# Patient Record
Sex: MALE | Race: WHITE | Employment: FULL TIME | URBAN - METROPOLITAN AREA
[De-identification: names, ages, dates, MRNs, and addresses within clinical notes are randomized per-mention and may not be internally consistent; named-entity substitution may affect disease eponyms.]

---

## 2018-01-17 ENCOUNTER — OFFICE VISIT (OUTPATIENT)
Dept: CARDIOLOGY CLINIC | Age: 53
End: 2018-01-17

## 2018-01-17 VITALS
HEART RATE: 54 BPM | HEIGHT: 73 IN | DIASTOLIC BLOOD PRESSURE: 86 MMHG | RESPIRATION RATE: 18 BRPM | OXYGEN SATURATION: 93 % | SYSTOLIC BLOOD PRESSURE: 136 MMHG | BODY MASS INDEX: 38.17 KG/M2 | WEIGHT: 288 LBS

## 2018-01-17 DIAGNOSIS — R94.31 ABNORMAL FINDING ON EKG: Primary | ICD-10-CM

## 2018-01-17 DIAGNOSIS — F10.10 ALCOHOL ABUSE: ICD-10-CM

## 2018-01-17 DIAGNOSIS — I10 ESSENTIAL HYPERTENSION: ICD-10-CM

## 2018-01-17 DIAGNOSIS — M25.472 ANKLE EDEMA, BILATERAL: ICD-10-CM

## 2018-01-17 DIAGNOSIS — E66.9 OBESITY (BMI 30-39.9): ICD-10-CM

## 2018-01-17 DIAGNOSIS — M25.471 ANKLE EDEMA, BILATERAL: ICD-10-CM

## 2018-01-17 DIAGNOSIS — E11.65 TYPE 2 DIABETES MELLITUS WITH HYPERGLYCEMIA, WITHOUT LONG-TERM CURRENT USE OF INSULIN (HCC): ICD-10-CM

## 2018-01-17 DIAGNOSIS — E78.5 DYSLIPIDEMIA: ICD-10-CM

## 2018-01-17 RX ORDER — METFORMIN HYDROCHLORIDE 1000 MG/1
1000 TABLET ORAL 2 TIMES DAILY WITH MEALS
COMMUNITY
End: 2018-02-23 | Stop reason: ALTCHOICE

## 2018-01-17 RX ORDER — ATORVASTATIN CALCIUM 20 MG/1
TABLET, FILM COATED ORAL DAILY
COMMUNITY

## 2018-01-17 RX ORDER — LOSARTAN POTASSIUM AND HYDROCHLOROTHIAZIDE 25; 100 MG/1; MG/1
1 TABLET ORAL DAILY
COMMUNITY

## 2018-01-17 RX ORDER — PIOGLITAZONEHYDROCHLORIDE 45 MG/1
TABLET ORAL DAILY
COMMUNITY
End: 2018-02-23 | Stop reason: ALTCHOICE

## 2018-01-17 RX ORDER — ATENOLOL 50 MG/1
TABLET ORAL DAILY
COMMUNITY

## 2018-01-17 RX ORDER — NATEGLINIDE 60 MG/1
60 TABLET ORAL
COMMUNITY
End: 2018-05-21 | Stop reason: ALTCHOICE

## 2018-01-17 RX ORDER — FUROSEMIDE 20 MG/1
TABLET ORAL DAILY
COMMUNITY

## 2018-01-17 RX ORDER — ESCITALOPRAM OXALATE 20 MG/1
20 TABLET ORAL DAILY
COMMUNITY

## 2018-01-17 NOTE — PROGRESS NOTES
[id number] [facility]        [phone]                             NEW PATIENT HPI/FOLLOW-UP    NAME:  Benji Gutierrez   :   1965   MRN:   [id number]   PCP:  No primary care provider on file.           Subjective: The patient is a 47y.o. year old male non-smoker with PMHx of HTN,dyslipidemia, NIDDM, obesity, alcohol abuse(case/week) who is referred by PCP/NP foe several week/month hx of lower extremity swelling worse at time sitting or standing. Admits to standing on cement floors as  over > 20 yrs. Now drives truck all day delivering products. Denies change in exercise tolerance, chest pain, medication intolerance, palpitations, shortness of breath, PND/orthopnea wheezing, sputum, syncope, dizziness or light headedness. Denies prior cardiac issues. Review of Systems:     [] Unable to obtain  ROS due to  []mental status change  []sedated   []intubated   [x]Total of 12 systems reviewed as follows:  Constitutional: negative fever, negative chills, negative weight loss  Eyes:   negative visual changes  ENT:   negative sore throat, tongue or lip swelling  Chest/Resp:  negative cough, wheezing, negative dyspnea,tenderness  Cards:  negative for chest pain, decreased exercise endurance, palpitations, ++lower extremity edema,PND,orthopnea,syncpoe,dizziness,lightheadedness  GI:   negative for nausea, vomiting, diarrhea, and abdominal pain  :  negative for frequency, dysuria  Integument:  negative for rash and pruritus  Heme:  negative for easy bruising and gum/nose bleeding  Musculoskel: negative for myalgias,  back pain and muscle weakness  Neuro:  negative for headaches, dizziness, vertigo  Psych:  negative for feelings of anxiety, depression     No past medical history on file. There are no active problems to display for this patient. No past surgical history on file. Allergies not on file   No family history on file.    Social History     Social History    Marital status: N/A     Spouse name: N/A    Number of children: N/A    Years of education: N/A     Occupational History    Not on file. Social History Main Topics    Smoking status: Not on file    Smokeless tobacco: Not on file    Alcohol use Not on file    Drug use: Not on file    Sexual activity: Not on file     Other Topics Concern    Not on file     Social History Narrative      No current outpatient prescriptions on file. No current facility-administered medications for this visit. I have reviewed the nurses notes, vitals, problem list, allergy list, medical history, family medical, social history and medications. Objective:     Physical Exam:     Vitals:    01/17/18 1353   BP: 136/86   Pulse: (!) 54   Resp: 18   SpO2: 93%   Weight: 288 lb (130.6 kg)   Height: 6' 1\" (1.854 m)    Body mass index is 38 kg/(m^2). General: Well developed, obese in no acute distress. HEENT: No carotid bruits, no JVD, trach is midline. Heart:  Normal S1/S2 negative S3 or S4. Regular, no murmur, gallop or rub.   Respiratory: Clear bilaterally, no wheezing or rales  Abdomen:   Soft, non-tender, bowel sounds are active.   Extremities:  ++ lower extremity edema, normal cap refill, no cyanosis. Neuro: A&Ox3, speech clear, gait stable. Skin: Skin color is normal. No rashes or lesions. No diaphoresis. Vascular: 2+ pulses symmetric in all extremities        Data Review:       Cardiographics:    EKG: SB, Twave inversion anteroseptally    Cardiology Labs:    No results found for this or any previous visit. No results found for: CHOL, CHOLX, CHLST, CHOLV, 479187, HDL, LDL, LDLC, DLDLP, TGLX, TRIGL, TRIGP, CHHD, CHHDX    No results found for: NA, K, CL, CO2, AGAP, GLU, BUN, CREA, BUCR, GFRAA, GFRNA, CA, TBIL, TBILI, GPT, SGOT, AP, TP, ALB, GLOB, AGRAT, ALT       Assessment:       ICD-10-CM ICD-9-CM    1.  Abnormal finding on EKG R94.31 794.31 AMB POC EKG ROUTINE W/ 12 LEADS, INTER & REP metFORMIN (GLUCOPHAGE) 1,000 mg tablet      losartan-hydroCHLOROthiazide (HYZAAR) 100-25 mg per tablet      nateglinide (STARLIX) 60 mg tablet      pioglitazone (ACTOS) 45 mg tablet      atenolol (TENORMIN) 50 mg tablet      atorvastatin (LIPITOR) 20 mg tablet      escitalopram oxalate (LEXAPRO) 20 mg tablet      furosemide (LASIX) 20 mg tablet      2D ECHO COMPLETE ADULT (TTE) W OR WO CONTR      NUCLEAR STRESS TEST      NM CARDIAC SPECT W STRS/REST MULT   2. Ankle edema, bilateral M25.471 719.07 AMB POC EKG ROUTINE W/ 12 LEADS, INTER & REP    M25.472  metFORMIN (GLUCOPHAGE) 1,000 mg tablet      losartan-hydroCHLOROthiazide (HYZAAR) 100-25 mg per tablet      nateglinide (STARLIX) 60 mg tablet      pioglitazone (ACTOS) 45 mg tablet      atenolol (TENORMIN) 50 mg tablet      atorvastatin (LIPITOR) 20 mg tablet      escitalopram oxalate (LEXAPRO) 20 mg tablet      furosemide (LASIX) 20 mg tablet      2D ECHO COMPLETE ADULT (TTE) W OR WO CONTR      NUCLEAR STRESS TEST      NM CARDIAC SPECT W STRS/REST MULT   3. Essential hypertension I10 401.9 AMB POC EKG ROUTINE W/ 12 LEADS, INTER & REP      metFORMIN (GLUCOPHAGE) 1,000 mg tablet      losartan-hydroCHLOROthiazide (HYZAAR) 100-25 mg per tablet      nateglinide (STARLIX) 60 mg tablet      pioglitazone (ACTOS) 45 mg tablet      atenolol (TENORMIN) 50 mg tablet      atorvastatin (LIPITOR) 20 mg tablet      escitalopram oxalate (LEXAPRO) 20 mg tablet      furosemide (LASIX) 20 mg tablet      2D ECHO COMPLETE ADULT (TTE) W OR WO CONTR      NUCLEAR STRESS TEST      NM CARDIAC SPECT W STRS/REST MULT   4.  Dyslipidemia E78.5 272.4 AMB POC EKG ROUTINE W/ 12 LEADS, INTER & REP      metFORMIN (GLUCOPHAGE) 1,000 mg tablet      losartan-hydroCHLOROthiazide (HYZAAR) 100-25 mg per tablet      nateglinide (STARLIX) 60 mg tablet      pioglitazone (ACTOS) 45 mg tablet      atenolol (TENORMIN) 50 mg tablet      atorvastatin (LIPITOR) 20 mg tablet      escitalopram oxalate (LEXAPRO) 20 mg tablet      furosemide (LASIX) 20 mg tablet      2D ECHO COMPLETE ADULT (TTE) W OR WO CONTR      NUCLEAR STRESS TEST      NM CARDIAC SPECT W STRS/REST MULT   5. Type 2 diabetes mellitus with hyperglycemia, without long-term current use of insulin (HCC) E11.65 250.00 AMB POC EKG ROUTINE W/ 12 LEADS, INTER & REP     790.29 metFORMIN (GLUCOPHAGE) 1,000 mg tablet      losartan-hydroCHLOROthiazide (HYZAAR) 100-25 mg per tablet      nateglinide (STARLIX) 60 mg tablet      pioglitazone (ACTOS) 45 mg tablet      atenolol (TENORMIN) 50 mg tablet      atorvastatin (LIPITOR) 20 mg tablet      escitalopram oxalate (LEXAPRO) 20 mg tablet      furosemide (LASIX) 20 mg tablet      2D ECHO COMPLETE ADULT (TTE) W OR WO CONTR      NUCLEAR STRESS TEST      NM CARDIAC SPECT W STRS/REST MULT   6. Obesity (BMI 30-39. 9) E66.9 278.00 AMB POC EKG ROUTINE W/ 12 LEADS, INTER & REP      metFORMIN (GLUCOPHAGE) 1,000 mg tablet      losartan-hydroCHLOROthiazide (HYZAAR) 100-25 mg per tablet      nateglinide (STARLIX) 60 mg tablet      pioglitazone (ACTOS) 45 mg tablet      atenolol (TENORMIN) 50 mg tablet      atorvastatin (LIPITOR) 20 mg tablet      escitalopram oxalate (LEXAPRO) 20 mg tablet      furosemide (LASIX) 20 mg tablet      2D ECHO COMPLETE ADULT (TTE) W OR WO CONTR      NUCLEAR STRESS TEST      NM CARDIAC SPECT W STRS/REST MULT   7.  Alcohol abuse F10.10 305.00 AMB POC EKG ROUTINE W/ 12 LEADS, INTER & REP      metFORMIN (GLUCOPHAGE) 1,000 mg tablet      losartan-hydroCHLOROthiazide (HYZAAR) 100-25 mg per tablet      nateglinide (STARLIX) 60 mg tablet      pioglitazone (ACTOS) 45 mg tablet      atenolol (TENORMIN) 50 mg tablet      atorvastatin (LIPITOR) 20 mg tablet      escitalopram oxalate (LEXAPRO) 20 mg tablet      furosemide (LASIX) 20 mg tablet      2D ECHO COMPLETE ADULT (TTE) W OR WO CONTR      NUCLEAR STRESS TEST      NM CARDIAC SPECT W STRS/REST MULT         Discussion: Patient presents at this time with new onset lower extremity edema associated with abnormal EKG suggesting anteroseptal ischemia, NIDDM,dyslipidemia,HTN, obesity, alcohol abuse. Will obtain NST to exclude underlying CAD and echo to exclude structural heart disease. I suspect lower extremity edema mostly due to CVI. After results reviewed consider readjusting antihypertensive meds to exclude amlodipine,BB. Suspect he would tolerate Cardizem or Verapamil,chlorthalidone, ARB and intermittent Lasix and compression stockings. Plan: 1. Continue same meds. Lipid profile and labs followed by PCP. 2.Encouraged to exercise to tolerance, lose weight and follow low fat, low cholesterol, low sodium predominantly Plant-based (consider Mediterranean) diet. Call with questions or concerns. Will follow up any test results by phone and/or f/u here in office if needed. Kirsten Canada 3.Follow up: 3-4 weeks    I have discussed the diagnosis with the patient and the intended plan as seen in the above orders. The patient has received an after-visit summary and questions were answered concerning future plans. I have discussed any concerning medication side effects and warnings with the patient as well.     Jenny Oliveira MD  1/17/2018

## 2018-01-17 NOTE — PROGRESS NOTES
1. Have you been to the ER, urgent care clinic since your last visit? Hospitalized since your last visit? No    2. Have you seen or consulted any other health care providers outside of the [address] [place] [address] since your last visit? Include any pap smears or colon screening. No    Chief Complaint   Patient presents with    Ankle swelling     NP, ref by Dr. Jj Alanis, ankle edema. Denied cardiac symptoms.

## 2018-02-23 ENCOUNTER — OFFICE VISIT (OUTPATIENT)
Dept: ENDOCRINOLOGY | Age: 53
End: 2018-02-23

## 2018-02-23 VITALS
HEIGHT: 73 IN | WEIGHT: 297 LBS | DIASTOLIC BLOOD PRESSURE: 81 MMHG | HEART RATE: 54 BPM | SYSTOLIC BLOOD PRESSURE: 129 MMHG | BODY MASS INDEX: 39.36 KG/M2

## 2018-02-23 DIAGNOSIS — E11.9 TYPE 2 DIABETES MELLITUS WITHOUT COMPLICATION, WITHOUT LONG-TERM CURRENT USE OF INSULIN (HCC): Primary | ICD-10-CM

## 2018-02-23 DIAGNOSIS — I10 ESSENTIAL HYPERTENSION WITH GOAL BLOOD PRESSURE LESS THAN 130/80: ICD-10-CM

## 2018-02-23 DIAGNOSIS — E78.5 HYPERLIPIDEMIA, UNSPECIFIED HYPERLIPIDEMIA TYPE: ICD-10-CM

## 2018-02-23 LAB — HBA1C MFR BLD HPLC: 5.8 %

## 2018-02-23 RX ORDER — AMLODIPINE BESYLATE 5 MG/1
5 TABLET ORAL DAILY
COMMUNITY

## 2018-02-23 NOTE — PROGRESS NOTES
CONSULTATION REQUESTED BY: Terance Leventhal, NP     REASON FOR CONSULT:  Evaluation for type 2 diabetes    CHIEF COMPLAINT: Blood glucose is high    HISTORY OF PRESENT ILLNESS:   Radha Jimenez is a 47 y.o. male with a PMHx as noted below who was referred to our endocrinology clinic for evaluation of type 2 diabetes. Diabetes History:  Diabetes was diagnosed around 2016  Family History of diabetes is positive in both grandmother  Last A1c prior to initial visit was 5.8% in Dec, again today per POC    Current Home Regimen:  - Nateglinide 60mg TID with meals  - Pioglitazone 45mg daily   - Metformin 1000mg BID    Review of home glucose:  Not checking his blood sugars    Review of most recent diabetes-related labs:  Labs scanned into chart, just seem to not have been entered as numerical values. PAST MEDICAL/SURGICAL HISTORY:   Type 2 diabetes, HTN, HLD    ALLERGIES:   No Known Allergies    MEDICATIONS ON ADMISSION:     Current Outpatient Prescriptions:     amLODIPine (NORVASC) 5 mg tablet, Take 5 mg by mouth daily. , Disp: , Rfl:     metFORMIN (GLUCOPHAGE) 1,000 mg tablet, Take 1,000 mg by mouth two (2) times daily (with meals). , Disp: , Rfl:     losartan-hydroCHLOROthiazide (HYZAAR) 100-25 mg per tablet, Take 1 Tab by mouth daily. , Disp: , Rfl:     nateglinide (STARLIX) 60 mg tablet, Take 60 mg by mouth Before breakfast, lunch, and dinner., Disp: , Rfl:     pioglitazone (ACTOS) 45 mg tablet, Take  by mouth daily. , Disp: , Rfl:     atenolol (TENORMIN) 50 mg tablet, Take  by mouth daily. , Disp: , Rfl:     atorvastatin (LIPITOR) 20 mg tablet, Take  by mouth daily. , Disp: , Rfl:     escitalopram oxalate (LEXAPRO) 20 mg tablet, Take 20 mg by mouth daily. , Disp: , Rfl:     furosemide (LASIX) 20 mg tablet, Take  by mouth daily. , Disp: , Rfl:     SOCIAL HISTORY:   Social History     Social History    Marital status:      Spouse name: N/A    Number of children: N/A    Years of education: N/A Occupational History    Not on file. Social History Main Topics    Smoking status: Former Smoker     Packs/day: 1.00     Years: 1.00     Quit date: 1/17/1988    Smokeless tobacco: Current User    Alcohol use Yes      Comment: 24 beers a week    Drug use: Not on file    Sexual activity: Not on file     Other Topics Concern    Not on file     Social History Narrative       FAMILY HISTORY:  Family History   Problem Relation Age of Onset    Elevated Lipids Mother     Arthritis-osteo Father     Diabetes Maternal Grandmother     Elevated Lipids Maternal Grandmother     Hypertension Maternal Grandmother     Cancer Maternal Grandfather     Diabetes Paternal Grandmother     Hypertension Paternal Grandmother        REVIEW OF SYSTEMS: Complete ROS assessed and noted for that which is described above, all else are negative.   Eyes: normal  ENT: normal  CVS: normal  Resp: normal  GI: normal  : normal  GYN: normal  Endocrine: normal  Integument: normal  Musculoskeletal: normal  Neuro: normal  Psych: normal      PHYSICAL EXAMINATION:    VITAL SIGNS:  Visit Vitals    /81 (BP 1 Location: Left arm, BP Patient Position: Sitting)    Pulse (!) 54    Ht 6' 1\" (1.854 m)    Wt 297 lb (134.7 kg)    BMI 39.18 kg/m2       GENERAL: NCAT, Sitting comfortably, NAD  EYES: EOMI, non-icteric, no proptosis  Ear/Nose/Throat: NCAT, no inflammation, no masses  LYMPH NODES: No LAD  CARDIOVASCULAR: S1 S2, RRR, No murmur, 2+ radial pulses  RESPIRATORY: CTA b/l, no wheeze/rales  GASTROINTESTINAL: NT, ND  MUSCULOSKELETAL: Normal ROM, no atrophy  SKIN: warm, no edema/rash/ or other skin changes  NEUROLOGIC: 5/5 power all extremities, no tremor, AAOx3  PSYCHIATRIC: Normal affect, Normal insight and judgement         Diabetic foot exam performed by Clinton Wood MD     Measurement  Response Nurse Comment Physician Comment   Monofilament  R - normal sensation with micro filament  L - normal sensation with micro filament Pulse DP R - 2+ (normal)  L - 2+ (normal)     Vibration R - Normal    L - Normal     Structural deformity R - Mild - early hammertoes  L - Mild - early hammertoes, thick pads     Skin Integrity / Deformity R - Mild - thick skin  L - Mild - thick skin        Reviewed by:   * I am treating the patient under a comprehensive plan of care for diabetes and the patient would benefit from diabetic footwear to protect their feet, and this includes shoes and insoles. REVIEW OF LABORATORY AND RADIOLOGY DATA:   Labs and documentation have been reviewed as described above. ASSESSMENT AND PLAN:   Flex Walter is a 47 y.o. male with a PMHx as noted above who was referred to our endocrinology clinic for evaluation of type 2 diabetes. Problems:  Type 2 diabetes  Hyperlipidemia  Hypertension    We had the pleasure of reviewing together the basics of diabetes including basic pathophysiology and diabetes care. We further discussed the importance of checking home glucose regularly and taking all of their scheduled medications in order to have the best possible outcome. I was able to answer any questions they had in clinic today and they are invited to reach me if they have any further questions. Based upon our discussion together today we have decided to make the following changes:    Patients diabetes is controlled, however he is having side effects from his treatment. For example he is having progressive leg swelling likely from the pioglitazone for which he takes an additional medication (lasix). It would be ideal to stop this medication now that he has insurance with something safer and effective. Also we can combine a new med with metformin such as janumet for example. Denies hx of thyroid issues or pancreatitis.  He is on nateglinide which is ok for now, as I would not want to make to many medication changes at once, but if stable on next visit, we can trial off of it, or replace it with an SGLT-2 inhibitor which we discussed today. We discussed the trends in A1c with weight, and the need to walk and watch diet. PLAN  Type 2 Diabetes  Medications:  Stop metformin (included in Janumet)  Stop pioglitazone (side effects / edema)  Start Janumet  BID  Continue nateglinide TID with meals  Advised to check glucose qAM  Provided with glucose log sheets for later review. Labs: Up to date  Feet: Examination performed today. Encouraged regular \"self-checks\" at home. Eyes: Advised updated eye exam report faxed to our office for review. Kidney: GFR/Urine microalbumin reviewed and stable    HTN: BP stable on amlodipine/losartan-HCTZ/atenolol  HLD: continue lipitor    RTC: I would like to see them back in 3 months.   >60 minutes spent together with patient today of which >50% of this time was spent in counseling and coordination of care. Karlene Gama.  [address] [facility]

## 2018-02-23 NOTE — PATIENT INSTRUCTIONS
STOP Metformin   STOP Pioglitazone    START Janumet before breakfast and before dinner  Continue Nateglinide before each meal ( we will consider stopping this later )    Continue all your blood pressure medications in the AM  Can take your lipitor    ----------------------------------------------------------------------------------------------------------------------    Below you will find a glucose log sheet which you can use to record your blood sugars. Without checking and recording what your home glucose levels are, it will be difficult to make any changes to your medication dose, even when significant changes may be needed. Please feel free to use the log below to record your home glucose levels. At the very least, I would like for you to login the entire 2-3 weeks just before your visit so we can make your visit much more productive and beneficial to you. GLUCOSE LOG SHEET:    Date Breakfast Lunch Dinner Bedtime Comments ? GLUCOSE LOG SHEET:    Date Breakfast Lunch Dinner Bedtime Comments ? GLUCOSE LOG SHEET:    Date Breakfast Lunch Dinner Bedtime Comments ?                                                                                                                                                                                                                                              Dr. Cisco Graham    Back to basics:    When you have diabetes or pre-diabetes, as you know, your body has difficulty dealing with the sugar it absorbs from your meals. An unrelated but very relevant term you may have heard before, quantitative easing, has a new meaning: By gradually reducing the load of sugars entering the body, you ease the stress on the body and allow it to catch up with the work it must do. This in turn will allow your body to work better. On top of this, remember one point, the more sugar stress your body has, the more you enter a state of glucose toxicity and this is a state where you become even more resistant. Thats right higher sugar = more resistance, not only to your own bodies attempt to fix the problem but also resistance to your medications. This is why medications work best when a proper diet is followed. Tip 1. Dont forget the protein. When you add a portion of meat or other low carb protein food in your meal, it provides healthy calories which contribute to reducing that feeling of hunger that drives you to eat what you dont want. Tip 2. Portions are a real thing. Before you eat, stop and look at your plate/table. Count how many items have sugars/carbs in them. A sandwich (bread) ? Sissy Lino ? Sweet drink ? Potatoe ? Pasta ? Rice ? You would be surprised when you become aware. Aim for a reasonable portion of carbs, and if you feel you absolutely cannot do this, at least start working toward this. 45-60 grams is usually more than enough in one meal. And YES, than includes the desert! Tip 3. Three meals per day, snack-free in between! Your body needs a break. Eating an adequate meal keeps you from getting hungry and reaching for a snack in between meals. Recall that most of the time, diabetic patients are not treating these snacks. Dont eat dinner late at night, but rather allow more overnight fasting time for your body to recover.  If you eat dinner at 8 PM, try 6 PM.  Sporadic eating is the opposite of what you need, and adjusting to a regular eating schedule such as this will not only be a great benefit to your body, but your medications will also tend to work better at keeping your diabetes under control. Tip 4. There is no best diet when it comes to weight loss. When comparing diets and outcomes, the main ingredient when searching for weight loss was calories ! Lower calories = better weight loss. Pick a healthy diet thats right for you, i.e. diabetes friendly, and evaluate your daily calorie intake. And of course this would be of no use without exercise. Calories in need calories out.

## 2018-02-23 NOTE — MR AVS SNAPSHOT
[address] [facility] [address] [address] [phone] Patient: Ghazala Be MRN: [id number] 
:1965 Visit Information Date & Time Provider Department Dept. Phone Encounter #  
 2018  1:30 PM Maliha Villafana, [address] [place] [address] Diabetes and Endocrinology [phone] [id number] Follow-up Instructions Return in about 3 months (around 2018). Upcoming Health Maintenance Date Due  
 FOOT EXAM Q1 1976 EYE EXAM RETINAL OR DILATED Q1 1976 Pneumococcal 19-64 Medium Risk (1 of 1 - PPSV23) 1985 DTaP/Tdap/Td series (1 - Tdap) 1987 FOBT Q 1 YEAR AGE 50-75 2016 Influenza Age 5 to Adult 2017 HEMOGLOBIN A1C Q6M 2018 MICROALBUMIN Q1 2018 LIPID PANEL Q1 2018 Allergies as of 2018  Review Complete On: 2018 By: Maliha Villafana MD  
 No Known Allergies Current Immunizations  Never Reviewed No immunizations on file. Not reviewed this visit You Were Diagnosed With   
  
 Codes Comments Type 2 diabetes mellitus without complication, without long-term current use of insulin (HCC)    -  Primary ICD-10-CM: E11.9 ICD-9-CM: 250.00 Essential hypertension with goal blood pressure less than 130/80     ICD-10-CM: I10 
ICD-9-CM: 401.9 Hyperlipidemia, unspecified hyperlipidemia type     ICD-10-CM: E78.5 ICD-9-CM: 272.4 Vitals BP Pulse Height(growth percentile) Weight(growth percentile) BMI Smoking Status 129/81 (BP 1 Location: Left arm, BP Patient Position: Sitting) (!) 54 6' 1\" (1.854 m) 297 lb (134.7 kg) 39.18 kg/m2 Former Smoker BMI and BSA Data Body Mass Index Body Surface Area  
 39.18 kg/m 2 2.63 m 2 Preferred Pharmacy Pharmacy Name Phone [id number] [place] [address] [place] [address] Way [phone] Your Updated Medication List  
  
   
 This list is accurate as of 2/23/18  2:23 PM.  Always use your most recent med list. amLODIPine 5 mg tablet Commonly known as:  Ardeen Squire Take 5 mg by mouth daily. atenolol 50 mg tablet Commonly known as:  TENORMIN Take  by mouth daily. atorvastatin 20 mg tablet Commonly known as:  LIPITOR Take  by mouth daily. escitalopram oxalate 20 mg tablet Commonly known as:  Clevester Clap Take 20 mg by mouth daily. LASIX 20 mg tablet Generic drug:  furosemide Take  by mouth daily. losartan-hydroCHLOROthiazide 100-25 mg per tablet Commonly known as:  HYZAAR Take 1 Tab by mouth daily. nateglinide 60 mg tablet Commonly known as:  Lendell [place] Take 60 mg by mouth Before breakfast, lunch, and dinner. SITagliptin-metFORMIN 50-1,000 mg per tablet Commonly known as:  Sable Pink Take 1 Tab by mouth two (2) times daily (with meals). Follow-up Instructions Return in about 3 months (around 5/23/2018). Patient Instructions STOP Metformin STOP Pioglitazone START Janumet before breakfast and before dinner Continue Nateglinide before each meal ( we will consider stopping this later ) Continue all your blood pressure medications in the AM 
Can take your lipitor 
 
---------------------------------------------------------------------------------------------------------------------- Below you will find a glucose log sheet which you can use to record your blood sugars. Without checking and recording what your home glucose levels are, it will be difficult to make any changes to your medication dose, even when significant changes may be needed. Please feel free to use the log below to record your home glucose levels. At the very least, I would like for you to login the entire 2-3 weeks just before your visit so we can make your visit much more productive and beneficial to you.   
 
GLUCOSE LOG SHEET: 
 
 Date Breakfast Lunch Dinner Bedtime Comments ? GLUCOSE LOG SHEET: 
 
Date Breakfast Lunch Dinner Bedtime Comments ? GLUCOSE LOG SHEET: 
 
Date Breakfast Lunch Dinner Bedtime Comments ? Dr. Rona Covarrubias Back to basics: 
 
When you have diabetes or pre-diabetes, as you know, your body has difficulty dealing with the sugar it absorbs from your meals. An unrelated but very relevant term you may have heard before, quantitative easing, has a new meaning: By gradually reducing the load of sugars entering the body, you ease the stress on the body and allow it to catch up with the work it must do. This in turn will allow your body to work better. On top of this, remember one point, the more sugar stress your body has, the more you enter a state of glucose toxicity and this is a state where you become even more resistant. Thats right higher sugar = more resistance, not only to your own bodies attempt to fix the problem but also resistance to your medications. This is why medications work best when a proper diet is followed. Tip 1. Dont forget the protein.  When you add a portion of meat or other low carb protein food in your meal, it provides healthy calories which contribute to reducing that feeling of hunger that drives you to eat what you dont want. Tip 2. Portions are a real thing. Before you eat, stop and look at your plate/table. Count how many items have sugars/carbs in them. A sandwich (bread) ? Lacy Tadeo ? Sweet drink ? Potatoe ? Pasta ? Rice ? You would be surprised when you become aware. Aim for a reasonable portion of carbs, and if you feel you absolutely cannot do this, at least start working toward this. 45-60 grams is usually more than enough in one meal. And YES, than includes the desert! Tip 3. Three meals per day, snack-free in between! Your body needs a break. Eating an adequate meal keeps you from getting hungry and reaching for a snack in between meals. Recall that most of the time, diabetic patients are not treating these snacks. Dont eat dinner late at night, but rather allow more overnight fasting time for your body to recover. If you eat dinner at 8 PM, try 6 PM.  Sporadic eating is the opposite of what you need, and adjusting to a regular eating schedule such as this will not only be a great benefit to your body, but your medications will also tend to work better at keeping your diabetes under control. Tip 4. There is no best diet when it comes to weight loss. When comparing diets and outcomes, the main ingredient when searching for weight loss was calories ! Lower calories = better weight loss. Pick a healthy diet thats right for you, i.e. diabetes friendly, and evaluate your daily calorie intake. And of course this would be of no use without exercise. Calories in need calories out. Introducing hospitals & HEALTH SERVICES! [facility] introduces [employer] patient portal. Now you can access parts of your medical record, email your doctor's office, and request medication refills online. 1. In your internet browser, go to [email or web address] 2. Click on the First Time User? Click Here link in the Sign In box.  You will see the New Member Sign Up page. 3. Enter your Ultriva Access Code exactly as it appears below. You will not need to use this code after youve completed the sign-up process. If you do not sign up before the expiration date, you must request a new code. · Ult[employer]a Access Code: [email or web address] Expires: 4/17/2018  1:37 PM 
 
4. Enter the last four digits of your Social Security Number (xxxx) and Date of Birth (mm/dd/yyyy) as indicated and click Submit. You will be taken to the next sign-up page. 5. Create a Ultriva ID. This will be your Ultriva login ID and cannot be changed, so think of one that is secure and easy to remember. 6. Create a Ultriva password. You can change your password at any time. 7. Enter your Password Reset Question and Answer. This can be used at a later time if you forget your password. 8. Enter your e-mail address. You will receive e-mail notification when new information is available in [address] 9. Click Sign Up. You can now view and download portions of your medical record. 10. Click the Download Summary menu link to download a portable copy of your medical information. If you have questions, please visit the Frequently Asked Questions section of the Ult[employer] website. Remember, Ultriva is NOT to be used for urgent needs. For medical emergencies, dial 911. Now available from your iPhone and Android! Please provide this summary of care documentation to your next provider. Your primary care clinician is listed as Radha Wright. If you have any questions after today's visit, please call [phone].

## 2018-05-21 ENCOUNTER — OFFICE VISIT (OUTPATIENT)
Dept: ENDOCRINOLOGY | Age: 53
End: 2018-05-21

## 2018-05-21 VITALS
HEART RATE: 60 BPM | WEIGHT: 296 LBS | DIASTOLIC BLOOD PRESSURE: 80 MMHG | HEIGHT: 73 IN | BODY MASS INDEX: 39.23 KG/M2 | SYSTOLIC BLOOD PRESSURE: 132 MMHG

## 2018-05-21 DIAGNOSIS — I10 ESSENTIAL HYPERTENSION WITH GOAL BLOOD PRESSURE LESS THAN 130/80: ICD-10-CM

## 2018-05-21 DIAGNOSIS — E11.9 TYPE 2 DIABETES MELLITUS WITHOUT COMPLICATION, WITHOUT LONG-TERM CURRENT USE OF INSULIN (HCC): Primary | ICD-10-CM

## 2018-05-21 DIAGNOSIS — E78.5 HYPERLIPIDEMIA, UNSPECIFIED HYPERLIPIDEMIA TYPE: ICD-10-CM
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
## 2018-05-21 NOTE — PATIENT INSTRUCTIONS
STOP STARLIX    Continue Janumet twice daily    Start Anju Lees (use coupon for $0 copay), 10mg daily  If insurance prefers a different brand of the same medicine, I will order that one instead,       See you back in 4 months,     Tanmay OCONNELL.  [address] [facility] Endocrinology  [address] [place] [address]

## 2018-05-21 NOTE — MR AVS SNAPSHOT
[address] [facility] [address] [address] [phone] Patient: Shabbir Durham MRN: [id number] 
:1965 Visit Information Date & Time Provider Department Dept. Phone Encounter #  
 2018  3:30 PM Aicha Kahn, [address] [place] [address] Diabetes and Endocrinology [phone] [id number] Follow-up Instructions Return in about 4 months (around 2018). Upcoming Health Maintenance Date Due  
 EYE EXAM RETINAL OR DILATED Q1 1976 Pneumococcal 19-64 Medium Risk (1 of 1 - PPSV23) 1985 DTaP/Tdap/Td series (1 - Tdap) 1987 FOBT Q 1 YEAR AGE 50-75 2016 Influenza Age 5 to Adult 2018 HEMOGLOBIN A1C Q6M 2018 MICROALBUMIN Q1 2018 LIPID PANEL Q1 2018 FOOT EXAM Q1 2019 Allergies as of 2018  Review Complete On: 2018 By: Aicha Kahn MD  
 No Known Allergies Current Immunizations  Never Reviewed No immunizations on file. Not reviewed this visit You Were Diagnosed With   
  
 Codes Comments Type 2 diabetes mellitus without complication, without long-term current use of insulin (HCC)    -  Primary ICD-10-CM: E11.9 ICD-9-CM: 250.00 Hyperlipidemia, unspecified hyperlipidemia type     ICD-10-CM: E78.5 ICD-9-CM: 272.4 Essential hypertension with goal blood pressure less than 130/80     ICD-10-CM: I10 
ICD-9-CM: 401.9 Vitals BP Pulse Height(growth percentile) Weight(growth percentile) BMI Smoking Status 132/80 (BP 1 Location: Right arm, BP Patient Position: Sitting) 60 6' 1\" (1.854 m) 296 lb (134.3 kg) 39.05 kg/m2 Former Smoker Vitals History BMI and BSA Data Body Mass Index Body Surface Area 39.05 kg/m 2 2.63 m 2 Preferred Pharmacy Pharmacy Name Phone [id number] [address] [phone] Your Updated Medication List  
  
   
 This list is accurate as of 5/21/18  3:48 PM.  Always use your most recent med list. amLODIPine 5 mg tablet Commonly known as:  Aziza Zarco Take 5 mg by mouth daily. atenolol 50 mg tablet Commonly known as:  TENORMIN Take  by mouth daily. atorvastatin 20 mg tablet Commonly known as:  LIPITOR Take  by mouth daily. dapagliflozin 10 mg Tab tablet Commonly known as:  [employer] Take 1 Tab by mouth daily. escitalopram oxalate 20 mg tablet Commonly known as:  Víctor Orts Take 20 mg by mouth daily. LASIX 20 mg tablet Generic drug:  furosemide Take  by mouth daily. losartan-hydroCHLOROthiazide 100-25 mg per tablet Commonly known as:  HYZAAR Take 1 Tab by mouth daily. SITagliptin-metFORMIN 50-1,000 mg per tablet Commonly known as:  Christain Diesel Take 1 Tab by mouth two (2) times daily (with meals). Prescriptions Sent to Pharmacy Refills  
 dapagliflozin (FARXIGA) 10 mg tab tablet 3 Sig: Take 1 Tab by mouth daily. Class: Normal  
 Pharmacy: [id number] - [place] Dee Tirado [address] [place] [address] #: [phone] Route: Oral  
  
Follow-up Instructions Return in about 4 months (around 9/21/2018). Patient Instructions STOP STARLIX Continue Janumet twice daily Start Vearl Tawnya (use coupon for $0 copay), 10mg daily If insurance prefers a different brand of the same medicine, I will order that one instead, See you back in 4 months,  
 
Hortencia PRESCOTT [address] [facility] End[facility] [employer] [facility] SERVICES! Winnie Ann introduces "[employer] patient portal. Now you can access parts of your medical record, email your doctor's office, and request medication refills online. 1. In your internet browser, go to [email or web address] 2. Click on the First Time User? Click Here link in the Sign In box.  You will see the New Member Sign Up page. 3. Enter your Apothesource Access Code exactly as it appears below. You will not need to use this code after youve completed the sign-up process. If you do not sign up before the expiration date, you must request a new code. · [employer] Access Code: [email or web address] Expires: 8/19/2018  3:48 PM 
 
4. Enter the last four digits of your Social Security Number (xxxx) and Date of Birth (mm/dd/yyyy) as indicated and click Submit. You will be taken to the next sign-up page. 5. Create a Apothesource ID. This will be your [employer] login ID and cannot be changed, so think of one that is secure and easy to remember. 6. Create a [employer] password. You can change your password at any time. 7. Enter your Password Reset Question and Answer. This can be used at a later time if you forget your password. 8. Enter your e-mail address. You will receive e-mail notification when new information is available in [address] 9. Click Sign Up. You can now view and download portions of your medical record. 10. Click the Download Summary menu link to download a portable copy of your medical information. If you have questions, please visit the Frequently Asked Questions section of the [employer] website. Remember, [employer] is NOT to be used for urgent needs. For medical emergencies, dial 911. Now available from your iPhone and Android! Please provide this summary of care documentation to your next provider. Your primary care clinician is listed as Miguel Greco. If you have any questions after today's visit, please call [phone].

## 2018-05-29 NOTE — PROGRESS NOTES
No coverage for farxiga,   Will try to request Jamila Cruz patients wife of coupon card online,    Dayday Melendez.  [address]

## 2018-09-20 ENCOUNTER — OFFICE VISIT (OUTPATIENT)
Dept: ENDOCRINOLOGY | Age: 53
End: 2018-09-20

## 2018-09-20 VITALS
WEIGHT: 273 LBS | HEIGHT: 73 IN | BODY MASS INDEX: 36.18 KG/M2 | SYSTOLIC BLOOD PRESSURE: 111 MMHG | DIASTOLIC BLOOD PRESSURE: 68 MMHG | HEART RATE: 54 BPM

## 2018-09-20 DIAGNOSIS — E11.9 TYPE 2 DIABETES MELLITUS WITHOUT COMPLICATION, WITHOUT LONG-TERM CURRENT USE OF INSULIN (HCC): Primary | ICD-10-CM

## 2018-09-20 DIAGNOSIS — E78.5 HYPERLIPIDEMIA, UNSPECIFIED HYPERLIPIDEMIA TYPE: ICD-10-CM

## 2018-09-20 DIAGNOSIS — I10 ESSENTIAL HYPERTENSION WITH GOAL BLOOD PRESSURE LESS THAN 130/80: ICD-10-CM

## 2018-09-20 NOTE — MR AVS SNAPSHOT
[address] [facility] [address] [address] [phone] Patient: Sophie Leonardo MRN: [id number] 
:1965 Visit Information Date & Time Provider Department Dept. Phone Encounter #  
 2018  3:30 PM Radha Haywood, [address] [place] [address] Diabetes and Endocrinology [phone] [id number] Follow-up Instructions Return in about 4 months (around 2019). Upcoming Health Maintenance Date Due  
 EYE EXAM RETINAL OR DILATED Q1 1976 Pneumococcal 19-64 Medium Risk (1 of 1 - PPSV23) 1985 DTaP/Tdap/Td series (1 - Tdap) 1987 FOBT Q 1 YEAR AGE 50-75 2016 Influenza Age 5 to Adult 2018 HEMOGLOBIN A1C Q6M 2018 MICROALBUMIN Q1 2018 LIPID PANEL Q1 2018 FOOT EXAM Q1 2019 Allergies as of 2018  Review Complete On: 2018 By: Radha Haywood MD  
 No Known Allergies Current Immunizations  Never Reviewed No immunizations on file. Not reviewed this visit You Were Diagnosed With   
  
 Codes Comments Type 2 diabetes mellitus without complication, without long-term current use of insulin (HCC)    -  Primary ICD-10-CM: E11.9 ICD-9-CM: 250.00 Hyperlipidemia, unspecified hyperlipidemia type     ICD-10-CM: E78.5 ICD-9-CM: 272.4 Essential hypertension with goal blood pressure less than 130/80     ICD-10-CM: I10 
ICD-9-CM: 401.9 Vitals BP Pulse Height(growth percentile) Weight(growth percentile) BMI Smoking Status 111/68 (BP 1 Location: Left arm, BP Patient Position: Sitting) (!) 54 6' 1\" (1.854 m) 273 lb (123.8 kg) 36.02 kg/m2 Former Smoker Vitals History BMI and BSA Data Body Mass Index Body Surface Area 36.02 kg/m 2 2.53 m 2 Preferred Pharmacy Pharmacy Name Phone [id number] [address] [facility] [phone] Your Updated Medication List  
  
   
 This list is accurate as of 9/20/18  3:46 PM.  Always use your most recent med list. amLODIPine 5 mg tablet Commonly known as:  Rudy Lux Take 5 mg by mouth daily. atenolol 50 mg tablet Commonly known as:  TENORMIN Take  by mouth daily. atorvastatin 20 mg tablet Commonly known as:  LIPITOR Take  by mouth daily. empagliflozin 25 mg tablet Commonly known as:  Katelin Spire Take 1 Tab by mouth daily. escitalopram oxalate 20 mg tablet Commonly known as:  Judy Basket Take 20 mg by mouth daily. LASIX 20 mg tablet Generic drug:  furosemide Take  by mouth daily. losartan-hydroCHLOROthiazide 100-25 mg per tablet Commonly known as:  HYZAAR Take 1 Tab by mouth daily. SITagliptin-metFORMIN 50-1,000 mg per tablet Commonly known as:  Welford Dominion Take 1 Tab by mouth two (2) times daily (with meals). We Performed the Following AMB POC HEMOGLOBIN A1C [32961 CPT(R)] HEMOGLOBIN A1C WITH EAG [62989 CPT(R)] LIPID PANEL [24813 CPT(R)] METABOLIC PANEL, COMPREHENSIVE [00326 CPT(R)] MICROALBUMIN, UR, RAND W/ MICROALB/CREAT RATIO [id number] CPT(R)] Follow-up Instructions Return in about 4 months (around 1/20/2019). Patient Instructions Jardiance 25mg once daily before breakfast 
 
Janumet  before breakfast and before dinner Your heart rate is a bit on the low side, reduce your atenolol to half tablet (25mg instead of 50mg), See you back in 4 months,  
 
Tameka PRESCOTT [address] [facility] [address] [place] [address] & HEALTH SERVICES! Nancy Barber introduces [employer] patient portal. Now you can access parts of your medical record, email your doctor's office, and request medication refills online. 1. In your internet browser, go to [email or web address] 2. Click on the First Time User? Click Here link in the Sign In box.  You will see the New Member Sign Up page. 3. Enter your L8 SmartLight Access Code exactly as it appears below. You will not need to use this code after youve completed the sign-up process. If you do not sign up before the expiration date, you must request a new code. · [employer] Access Code: [email or web address] Expires: 12/19/2018  3:34 PM 
 
4. Enter the last four digits of your Social Security Number (xxxx) and Date of Birth (mm/dd/yyyy) as indicated and click Submit. You will be taken to the next sign-up page. 5. Create a L8 SmartLight ID. This will be your [employer] login ID and cannot be changed, so think of one that is secure and easy to remember. 6. Create a L[employer] SmartLight password. You can change your password at any time. 7. Enter your Password Reset Question and Answer. This can be used at a later time if you forget your password. 8. Enter your e-mail address. You will receive e-mail notification when new information is available in [address] 9. Click Sign Up. You can now view and download portions of your medical record. 10. Click the Download Summary menu link to download a portable copy of your medical information. If you have questions, please visit the Frequently Asked Questions section of the [employer] website. Remember, [employer] is NOT to be used for urgent needs. For medical emergencies, dial 911. Now available from your iPhone and Android! Please provide this summary of care documentation to your next provider. Your primary care clinician is listed as Zina Severs. If you have any questions after today's visit, please call [phone].

## 2018-09-20 NOTE — PROGRESS NOTES
CHIEF COMPLAINT: f/u evaluation for uncontrolled type 2 diabetes    HISTORY OF PRESENT ILLNESS:   Amaya Alfaro is a 47 y.o. male with a PMHx as noted below who presents to the endocrinology clinic for f/u evaluation of type 2 diabetes. Previously pioglitazone was stopped due to progressive swelling. We combined metformin with Januvia as Janumet, and most recently as of the prior visit we discontinued his nateglinide TID in favor of starting jardiance. This regimen reduced the risk of hypoglycemia and other medication related complications. Currently taking the following meds:  Janumet  BID  Jardiance 25mg once daily    Review of home blood glucose:  Not checking. PAST MEDICAL/SURGICAL HISTORY:   Type 2 diabetes  HTN  HLD    ALLERGIES:   No Known Allergies    MEDICATIONS ON ADMISSION:     Current Outpatient Prescriptions:     empagliflozin (JARDIANCE) 25 mg tablet, Take 1 Tab by mouth daily. , Disp: 90 Tab, Rfl: 3    amLODIPine (NORVASC) 5 mg tablet, Take 5 mg by mouth daily. , Disp: , Rfl:     SITagliptin-metFORMIN (JANUMET) 50-1,000 mg per tablet, Take 1 Tab by mouth two (2) times daily (with meals). , Disp: 28 Tab, Rfl: 0    losartan-hydroCHLOROthiazide (HYZAAR) 100-25 mg per tablet, Take 1 Tab by mouth daily. , Disp: , Rfl:     atenolol (TENORMIN) 50 mg tablet, Take  by mouth daily. , Disp: , Rfl:     atorvastatin (LIPITOR) 20 mg tablet, Take  by mouth daily. , Disp: , Rfl:     escitalopram oxalate (LEXAPRO) 20 mg tablet, Take 20 mg by mouth daily. , Disp: , Rfl:     furosemide (LASIX) 20 mg tablet, Take  by mouth daily. , Disp: , Rfl:     SOCIAL HISTORY:   Social History     Social History    Marital status:      Spouse name: N/A    Number of children: N/A    Years of education: N/A     Occupational History    Not on file.      Social History Main Topics    Smoking status: Former Smoker     Packs/day: 1.00     Years: 1.00     Quit date: 1/17/1988    Smokeless tobacco: Current User    Alcohol use Yes      Comment: 24 beers a week    Drug use: Not on file    Sexual activity: Not on file     Other Topics Concern    Not on file     Social History Narrative       FAMILY HISTORY:  Family History   Problem Relation Age of Onset    Elevated Lipids Mother     Arthritis-osteo Father     Diabetes Maternal Grandmother     Elevated Lipids Maternal Grandmother     Hypertension Maternal Grandmother     Cancer Maternal Grandfather     Diabetes Paternal Grandmother     Hypertension Paternal Grandmother        REVIEW OF SYSTEMS: Complete ROS assessed and noted for that which is described above, all else are negative. Eyes: normal  ENT: normal  CVS: normal  Resp: normal  GI: normal  : normal  GYN: normal  Endocrine: normal  Integument: normal  Musculoskeletal: normal  Neuro: normal  Psych: normal      PHYSICAL EXAMINATION:    VITAL SIGNS:  Visit Vitals    /68 (BP 1 Location: Left arm, BP Patient Position: Sitting)    Pulse (!) 54    Ht 6' 1\" (1.854 m)    Wt 273 lb (123.8 kg)    BMI 36.02 kg/m2       GENERAL: NCAT, Sitting comfortably, NAD  EYES: EOMI, non-icteric, no proptosis  Ear/Nose/Throat: NCAT, no inflammation, no masses  LYMPH NODES: No LAD  CARDIOVASCULAR: S1 S2, RRR, No murmur, 2+ radial pulses  RESPIRATORY: CTA b/l, no wheeze/rales  GASTROINTESTINAL: ND  MUSCULOSKELETAL: Normal ROM, no atrophy  SKIN: warm, no edema/rash/ or other skin changes  NEUROLOGIC: 5/5 power all extremities, no tremors, AAOx3  PSYCHIATRIC: Normal affect, Normal insight and judgement      REVIEW OF LABORATORY AND RADIOLOGY DATA:   Labs and documentation have been reviewed as described above. ASSESSMENT AND PLAN:   [facility] 47 [facility] with a PMHx as noted above who presents to the endocrinology clinic for f/u evaluation of type 2 diabetes.      DM2   HTN  HLD    DM2:  Jardiance 25mg once daily before breakfast  Janumet  BID    HTN: BP stable amlodipine/losartan-HCTZ/atenolol, HR is <60, advised to reduce atenolol by 1/2 tablet  HLD: stable, continue lipitor    Labs: pre-labs ordered to be completed before next visit    4 month f/u visit. Tawanna President.  [address] [facility]

## 2018-09-20 NOTE — PATIENT INSTRUCTIONS
Jardiance 25mg once daily before breakfast    Janumet  before breakfast and before dinner    Your heart rate is a bit on the low side, reduce your atenolol to half tablet (25mg instead of 50mg),       See you back in 4 months,     Taco PRESCOTT  [address]

## 2018-09-21 LAB — HBA1C MFR BLD HPLC: 6 %

## 2018-11-06 LAB
CREATININE, EXTERNAL: NORMAL
HBA1C MFR BLD HPLC: NORMAL %
LDL-C, EXTERNAL: NORMAL
MICROALBUMIN UR TEST STR-MCNC: 15 MG/DL

## 2019-01-21 ENCOUNTER — OFFICE VISIT (OUTPATIENT)
Dept: ENDOCRINOLOGY | Age: 54
End: 2019-01-21

## 2019-01-21 VITALS
BODY MASS INDEX: 35.39 KG/M2 | DIASTOLIC BLOOD PRESSURE: 70 MMHG | HEIGHT: 73 IN | SYSTOLIC BLOOD PRESSURE: 113 MMHG | HEART RATE: 66 BPM | WEIGHT: 267 LBS

## 2019-01-21 DIAGNOSIS — E78.5 HYPERLIPIDEMIA, UNSPECIFIED HYPERLIPIDEMIA TYPE: ICD-10-CM

## 2019-01-21 DIAGNOSIS — I10 ESSENTIAL HYPERTENSION WITH GOAL BLOOD PRESSURE LESS THAN 130/80: ICD-10-CM

## 2019-01-21 DIAGNOSIS — E11.9 TYPE 2 DIABETES MELLITUS WITHOUT COMPLICATION, WITHOUT LONG-TERM CURRENT USE OF INSULIN (HCC): Primary | ICD-10-CM

## 2019-01-21 NOTE — PROGRESS NOTES
CHIEF COMPLAINT: f/u evaluation for uncontrolled type 2 diabetes HISTORY OF PRESENT ILLNESS:  
Abad Sal is a 47 y.o. male with a PMHx as noted below who presents to the endocrinology clinic for f/u evaluation of type 2 diabetes. A1c today in clinic is 5.7% on jardiance and janumet. Had his blood work (prelabs we ordered prev) drawn early before the 2019 year in Nov.   
 
Currently taking the following meds: 
Janumet  BID Jardiance 25mg once daily Review of home blood glucose: Not checking. PAST MEDICAL/SURGICAL HISTORY:  
Type 2 diabetes HTN 
HLD ALLERGIES:  
No Known Allergies MEDICATIONS ON ADMISSION:  
 
Current Outpatient Medications:  
  empagliflozin (JARDIANCE) 25 mg tablet, Take 1 Tab by mouth daily. , Disp: 90 Tab, Rfl: 3 
  amLODIPine (NORVASC) 5 mg tablet, Take 5 mg by mouth daily. , Disp: , Rfl:  
  SITagliptin-metFORMIN (JANUMET) 50-1,000 mg per tablet, Take 1 Tab by mouth two (2) times daily (with meals). , Disp: 28 Tab, Rfl: 0 
  atenolol (TENORMIN) 50 mg tablet, Take  by mouth daily. , Disp: , Rfl:  
  atorvastatin (LIPITOR) 20 mg tablet, Take  by mouth daily. , Disp: , Rfl:  
  escitalopram oxalate (LEXAPRO) 20 mg tablet, Take 20 mg by mouth daily. , Disp: , Rfl:  
  furosemide (LASIX) 20 mg tablet, Take  by mouth daily. , Disp: , Rfl:  
  losartan-hydroCHLOROthiazide (HYZAAR) 100-25 mg per tablet, Take 1 Tab by mouth daily. , Disp: , Rfl:  
 
SOCIAL HISTORY:  
Social History Socioeconomic History  Marital status:  Spouse name: Not on file  Number of children: Not on file  Years of education: Not on file  Highest education level: Not on file Social Needs  Financial resource strain: Not on file  Food insecurity - worry: Not on file  Food insecurity - inability: Not on file  Transportation needs - medical: Not on file  Transportation needs - non-medical: Not on file Occupational History  Not on file Tobacco Use  
  Smoking status: Former Smoker Packs/day: 1.00 Years: 1.00 Pack years: 1.00 Last attempt to quit: 1988 Years since quittin.0  Smokeless tobacco: Current User Substance and Sexual Activity  Alcohol use: Yes Comment: 24 beers a week  Drug use: Not on file  Sexual activity: Not on file Other Topics Concern  Not on file Social History Narrative  Not on file FAMILY HISTORY: 
Family History Problem Relation Age of Onset  Elevated Lipids Mother  Arthritis-osteo Father  Diabetes Maternal Grandmother  Elevated Lipids Maternal Grandmother  Hypertension Maternal Grandmother  Cancer Maternal Grandfather  Diabetes Paternal Grandmother  Hypertension Paternal Grandmother REVIEW OF SYSTEMS: Complete ROS assessed and noted for that which is described above, all else are negative. Eyes: normal 
ENT: normal 
CVS: normal 
Resp: normal 
GI: normal 
: normal 
GYN: normal 
Endocrine: normal 
Integument: normal 
Musculoskeletal: normal 
Neuro: normal 
Psych: normal 
 
 
PHYSICAL EXAMINATION: 
 
VITAL SIGNS: 
Visit Vitals /70 (BP 1 Location: Left arm, BP Patient Position: Sitting) Pulse 66 Ht 6' 1\" (1.854 m) Wt 267 lb (121.1 kg) BMI 35.23 kg/m² GENERAL: NCAT, Sitting comfortably, NAD 
EYES: EOMI, non-icteric, no proptosis Ear/Nose/Throat: NCAT, no inflammation, no masses LYMPH NODES: No LAD CARDIOVASCULAR: S1 S2, RRR, No murmur, 2+ radial pulses RESPIRATORY: CTA b/l, no wheeze/rales GASTROINTESTINAL: ND 
MUSCULOSKELETAL: Normal ROM, no atrophy SKIN: warm, no edema/rash/ or other skin changes NEUROLOGIC: 5/5 power all extremities, no tremors, AAOx3 PSYCHIATRIC: Normal affect, Normal insight and judgement REVIEW OF LABORATORY AND RADIOLOGY DATA:  
Labs and documentation have been reviewed as described above. Diabetic foot exam:  
 
Left Foot: 
 Visual Exam: normal  hammer toes present Pulse DP: 2+ (normal) Filament test: normal sensation Vibratory sensation: Vibratory sensation: normal  
   
Right Foot: 
 Visual Exam: normal  hammer toes present Pulse DP: 2+ (normal) Filament test: normal sensation Vibratory sensation: Vibratory sensation: normal 
 
ASSESSMENT AND PLAN:  
Es Villegas is a 47 y.o. male with a PMHx as noted above who presents to the endocrinology clinic for f/u evaluation of type 2 diabetes. DM2  
HTN 
HLD Patient has been doing very well with A1c of 5.7%. I am updating his diabetes panel today and completing his annual foot examination. Because patient has continued to do well with this regimen, I am discharging him from endocrinology clinic to continue routine follow up with his primary care team. I have provided him with exit counseling on how to better manage his diabetes and how to approach issues in the future as they arise. He is welcome to return to clinic in the future if his A1c rises out of control in the future which is not improved with treatment attempts with his primary clinic. DM2: 
Jardiance 25 mg once daily before breakfast 
Janumet  BID 
 
HTN: BP stable on current meds HLD: On lipitor, panel today Labs: urine microalbumin today Disposition: Discharged from endocrine clinic, doing well, f/u for routine diabetes care with primary team 
25 minutes spent together with patient today of which >50% of this time was spent in counseling and coordination of care. Amelia Folwers. [address] [facility]

## 2019-01-21 NOTE — PATIENT INSTRUCTIONS
Jardiance 25mg once daily before breakfast 
 
Janumet  before breakfast and before dinner You are discharged from diabetes clinic as your diabetes control has been well. Plan to resume normal diabetes care with your primary care clinic. It was a pleasure to work with you on your diabetes treatment. David Akers. [address] [facility] [address] [facility]

## 2019-01-22 LAB
ALBUMIN/CREAT UR: 15.7 MG/G CREAT (ref 0–30)
CREAT UR-MCNC: 74.1 MG/DL
HBA1C MFR BLD HPLC: 5.7 %
MICROALBUMIN UR-MCNC: 11.6 UG/ML

## 2019-01-25 ENCOUNTER — DOCUMENTATION ONLY (OUTPATIENT)
Dept: ENDOCRINOLOGY | Age: 54
End: 2019-01-25

## 2021-03-08 ENCOUNTER — TRANSCRIBE ORDER (OUTPATIENT)
Dept: ENDOCRINOLOGY | Age: 56
End: 2021-03-08

## 2021-03-08 ENCOUNTER — TELEPHONE (OUTPATIENT)
Dept: ENDOCRINOLOGY | Age: 56
End: 2021-03-08

## 2021-03-08 NOTE — TELEPHONE ENCOUNTER
----- Message from Arpan Rojas sent at 3/8/2021  3:33 PM EST -----  Regarding: Dr. Sirisha Schaeffer General Message/Vendor Calls    Caller's first and last name: Jayme Rothman (wife)      Reason for call: Regarding scheduling f/up med refill apt. Stated pt has 9 days left of Rx JANUMET 50-1,000 mg, completely out of furosemide (LASIX) 20 mg .       Call back required yes/no and why: Yes       Best contact number(s): [phone]      Details to clarify the request:      Arpan Rojas

## 2021-03-09 NOTE — TELEPHONE ENCOUNTER
I returned this call and let Ms. Davis know that she should contact Tonja Ziyad Andres PCP for any further refills. She asked me to fax a copy of his last office visit to his PCP which I did. She will contact them for refills.   Wally Valencia

## 2021-07-01 ENCOUNTER — TRANSCRIBE ORDER (OUTPATIENT)
Dept: SCHEDULING | Age: 56
End: 2021-07-01

## 2021-07-01 DIAGNOSIS — S83.242A ACUTE MEDIAL MENISCUS TEAR OF LEFT KNEE: Primary | ICD-10-CM

## 2022-03-18 PROBLEM — E11.65 HYPERGLYCEMIA DUE TO TYPE 2 DIABETES MELLITUS (HCC): Status: ACTIVE | Noted: 2018-01-17

## 2022-03-19 PROBLEM — E78.5 DYSLIPIDEMIA: Status: ACTIVE | Noted: 2018-01-17

## 2022-03-19 PROBLEM — E11.65 TYPE 2 DIABETES MELLITUS WITH HYPERGLYCEMIA, WITHOUT LONG-TERM CURRENT USE OF INSULIN (HCC): Status: ACTIVE | Noted: 2018-01-17

## 2022-03-19 PROBLEM — E66.9 OBESITY (BMI 30-39.9): Status: ACTIVE | Noted: 2018-01-17

## 2022-03-19 PROBLEM — F10.10 ALCOHOL ABUSE: Status: ACTIVE | Noted: 2018-01-17

## 2022-03-19 PROBLEM — M25.472 ANKLE EDEMA, BILATERAL: Status: ACTIVE | Noted: 2018-01-17

## 2022-03-19 PROBLEM — M25.471 ANKLE EDEMA, BILATERAL: Status: ACTIVE | Noted: 2018-01-17

## 2022-03-20 PROBLEM — I10 ESSENTIAL HYPERTENSION: Status: ACTIVE | Noted: 2018-01-17

## 2022-03-20 PROBLEM — R94.31 ABNORMAL FINDING ON EKG: Status: ACTIVE | Noted: 2018-01-17

## 2024-02-05 ENCOUNTER — HOSPITAL ENCOUNTER (OUTPATIENT)
Facility: HOSPITAL | Age: 59
Discharge: HOME | End: 2024-02-08
Attending: INTERNAL MEDICINE
Payer: COMMERCIAL

## 2024-02-05 DIAGNOSIS — D72.819 LEUKOPENIA, UNSPECIFIED TYPE: ICD-10-CM

## 2024-02-05 PROCEDURE — 76700 US EXAM ABDOM COMPLETE: CPT

## 2024-08-07 ENCOUNTER — CLINICAL DOCUMENTATION (OUTPATIENT)
Age: 59
End: 2024-08-07

## 2024-08-28 ENCOUNTER — CLINICAL DOCUMENTATION (OUTPATIENT)
Age: 59
End: 2024-08-28

## 2024-08-28 NOTE — PROGRESS NOTES
Received records from JAIME Ellsworth at [facility]. Dx: elevated ALT. Further review not needed. Pls schedule next available routine appt

## 2024-10-18 ENCOUNTER — CLINICAL DOCUMENTATION (OUTPATIENT)
Age: 59
End: 2024-10-18

## 2024-10-18 NOTE — PROGRESS NOTES
Left message asking patient to call to schedule NP appt. Referred by Sarah Godinez NP [facility]. Records received.

## 2024-10-21 ENCOUNTER — CLINICAL DOCUMENTATION (OUTPATIENT)
Age: 59
End: 2024-10-21

## 2024-10-21 NOTE — PROGRESS NOTES
Wife called back regarding NP referral received in August from Sarah Godinez NP-[facility]. Wife will confirm if appointment is still needed.